# Patient Record
Sex: FEMALE | Race: WHITE
[De-identification: names, ages, dates, MRNs, and addresses within clinical notes are randomized per-mention and may not be internally consistent; named-entity substitution may affect disease eponyms.]

---

## 2020-02-27 ENCOUNTER — HOSPITAL ENCOUNTER (INPATIENT)
Dept: HOSPITAL 26 - MED | Age: 55
LOS: 4 days | Discharge: NURSING, REHAB OR LONG-TERM CARE | DRG: 641 | End: 2020-03-02
Attending: GENERAL PRACTICE | Admitting: GENERAL PRACTICE
Payer: COMMERCIAL

## 2020-02-27 VITALS — DIASTOLIC BLOOD PRESSURE: 46 MMHG | SYSTOLIC BLOOD PRESSURE: 99 MMHG

## 2020-02-27 VITALS — WEIGHT: 198 LBS | BODY MASS INDEX: 33.8 KG/M2 | HEIGHT: 64 IN

## 2020-02-27 VITALS — DIASTOLIC BLOOD PRESSURE: 38 MMHG | SYSTOLIC BLOOD PRESSURE: 93 MMHG

## 2020-02-27 VITALS — SYSTOLIC BLOOD PRESSURE: 96 MMHG | DIASTOLIC BLOOD PRESSURE: 52 MMHG

## 2020-02-27 VITALS — DIASTOLIC BLOOD PRESSURE: 54 MMHG | SYSTOLIC BLOOD PRESSURE: 101 MMHG

## 2020-02-27 DIAGNOSIS — K52.9: ICD-10-CM

## 2020-02-27 DIAGNOSIS — K80.50: ICD-10-CM

## 2020-02-27 DIAGNOSIS — K59.00: ICD-10-CM

## 2020-02-27 DIAGNOSIS — Z90.49: ICD-10-CM

## 2020-02-27 DIAGNOSIS — G80.9: ICD-10-CM

## 2020-02-27 DIAGNOSIS — J10.1: ICD-10-CM

## 2020-02-27 DIAGNOSIS — E86.1: ICD-10-CM

## 2020-02-27 DIAGNOSIS — D63.8: ICD-10-CM

## 2020-02-27 DIAGNOSIS — F20.9: ICD-10-CM

## 2020-02-27 DIAGNOSIS — E03.9: ICD-10-CM

## 2020-02-27 DIAGNOSIS — K83.8: ICD-10-CM

## 2020-02-27 DIAGNOSIS — E87.1: Primary | ICD-10-CM

## 2020-02-27 DIAGNOSIS — E83.42: ICD-10-CM

## 2020-02-27 DIAGNOSIS — F41.9: ICD-10-CM

## 2020-02-27 DIAGNOSIS — F79: ICD-10-CM

## 2020-02-27 DIAGNOSIS — R74.0: ICD-10-CM

## 2020-02-27 DIAGNOSIS — R65.10: ICD-10-CM

## 2020-02-27 LAB
ALBUMIN FLD-MCNC: 3.4 G/DL (ref 3.4–5)
ANION GAP SERPL CALCULATED.3IONS-SCNC: 10.2 MMOL/L (ref 8–16)
ANION GAP SERPL CALCULATED.3IONS-SCNC: 10.3 MMOL/L (ref 8–16)
ANION GAP SERPL CALCULATED.3IONS-SCNC: 11.4 MMOL/L (ref 8–16)
ANION GAP SERPL CALCULATED.3IONS-SCNC: 8.8 MMOL/L (ref 8–16)
APPEARANCE UR: CLEAR
AST SERPL-CCNC: 62 U/L (ref 15–37)
BARBITURATES UR QL SCN: (no result) NG/ML
BASOPHILS # BLD AUTO: 0.1 K/UL (ref 0–0.22)
BASOPHILS NFR BLD AUTO: 2.1 % (ref 0–2)
BENZODIAZ UR QL SCN: (no result) NG/ML
BILIRUB SERPL-MCNC: 0.5 MG/DL (ref 0–1)
BILIRUB UR QL STRIP: NEGATIVE
BUN SERPL-MCNC: 11 MG/DL (ref 7–18)
BUN SERPL-MCNC: 12 MG/DL (ref 7–18)
BUN SERPL-MCNC: 12 MG/DL (ref 7–18)
BUN SERPL-MCNC: 15 MG/DL (ref 7–18)
BZE UR QL SCN: (no result) NG/ML
CANNABINOIDS UR QL SCN: (no result) NG/ML
CHLORIDE SERPL-SCNC: 90 MMOL/L (ref 98–107)
CHLORIDE SERPL-SCNC: 94 MMOL/L (ref 98–107)
CO2 SERPL-SCNC: 22.5 MMOL/L (ref 21–32)
CO2 SERPL-SCNC: 23.3 MMOL/L (ref 21–32)
CO2 SERPL-SCNC: 23.7 MMOL/L (ref 21–32)
CO2 SERPL-SCNC: 24.8 MMOL/L (ref 21–32)
COLOR UR: YELLOW
CREAT SERPL-MCNC: 0.7 MG/DL (ref 0.6–1.3)
CREAT SERPL-MCNC: 0.7 MG/DL (ref 0.6–1.3)
CREAT SERPL-MCNC: 0.8 MG/DL (ref 0.6–1.3)
CREAT SERPL-MCNC: 0.9 MG/DL (ref 0.6–1.3)
EOSINOPHIL # BLD AUTO: 0 K/UL (ref 0–0.4)
EOSINOPHIL NFR BLD AUTO: 0.2 % (ref 0–4)
ERYTHROCYTE [DISTWIDTH] IN BLOOD BY AUTOMATED COUNT: 13.6 % (ref 11.6–13.7)
GFR SERPL CREATININE-BSD FRML MDRD: 111 ML/MIN (ref 90–?)
GFR SERPL CREATININE-BSD FRML MDRD: 111 ML/MIN (ref 90–?)
GFR SERPL CREATININE-BSD FRML MDRD: 83 ML/MIN (ref 90–?)
GFR SERPL CREATININE-BSD FRML MDRD: 95 ML/MIN (ref 90–?)
GLUCOSE SERPL-MCNC: 108 MG/DL (ref 74–106)
GLUCOSE SERPL-MCNC: 77 MG/DL (ref 74–106)
GLUCOSE SERPL-MCNC: 84 MG/DL (ref 74–106)
GLUCOSE SERPL-MCNC: 92 MG/DL (ref 74–106)
GLUCOSE UR STRIP-MCNC: NEGATIVE MG/DL
HCT VFR BLD AUTO: 32.5 % (ref 36–48)
HGB BLD-MCNC: 11.4 G/DL (ref 12–16)
HGB UR QL STRIP: (no result)
IRON SERPL-MCNC: 35 UG/DL (ref 50–175)
LEUKOCYTE ESTERASE UR QL STRIP: NEGATIVE
LIPASE SERPL-CCNC: 94 U/L (ref 73–393)
LYMPHOCYTES # BLD AUTO: 0.3 K/UL (ref 2.5–16.5)
LYMPHOCYTES NFR BLD AUTO: 6.6 % (ref 20.5–51.1)
MAGNESIUM SERPL-MCNC: 1.7 MG/DL (ref 1.8–2.4)
MCH RBC QN AUTO: 34 PG (ref 27–31)
MCHC RBC AUTO-ENTMCNC: 35 G/DL (ref 33–37)
MCV RBC AUTO: 97.1 FL (ref 80–94)
MONOCYTES # BLD AUTO: 0.4 K/UL (ref 0.8–1)
MONOCYTES NFR BLD AUTO: 7.6 % (ref 1.7–9.3)
NEUTROPHILS # BLD AUTO: 4.2 K/UL (ref 1.8–7.7)
NEUTROPHILS NFR BLD AUTO: 83.5 % (ref 42.2–75.2)
NITRITE UR QL STRIP: NEGATIVE
OPIATES UR QL SCN: (no result) NG/ML
PCP UR QL SCN: (no result) NG/ML
PH UR STRIP: 7 [PH] (ref 5–9)
PHOSPHATE SERPL-MCNC: 2.9 MG/DL (ref 2.5–4.9)
PLATELET # BLD AUTO: 202 K/UL (ref 140–450)
POTASSIUM SERPL-SCNC: 3.8 MMOL/L (ref 3.5–5.1)
POTASSIUM SERPL-SCNC: 4 MMOL/L (ref 3.5–5.1)
POTASSIUM SERPL-SCNC: 4.1 MMOL/L (ref 3.5–5.1)
POTASSIUM SERPL-SCNC: 4.1 MMOL/L (ref 3.5–5.1)
PROTHROMBIN TIME: 10.8 SECS (ref 10.8–13.4)
RBC # BLD AUTO: 3.35 MIL/UL (ref 4.2–5.4)
SODIUM SERPL-SCNC: 118 MMOL/L (ref 136–145)
SODIUM SERPL-SCNC: 121 MMOL/L (ref 136–145)
SODIUM SERPL-SCNC: 121 MMOL/L (ref 136–145)
SODIUM SERPL-SCNC: 123 MMOL/L (ref 136–145)
TIBC SERPL-MCNC: 286 UG/DL (ref 250–450)
TSH SERPL DL<=0.05 MIU/L-ACNC: 1.7 UIU/ML (ref 0.34–3.74)
WBC # BLD AUTO: 5 K/UL (ref 4.8–10.8)

## 2020-02-27 PROCEDURE — C1758 CATHETER, URETERAL: HCPCS

## 2020-02-27 RX ADMIN — DOCUSATE SODIUM SCH MG: 100 CAPSULE, LIQUID FILLED ORAL at 22:20

## 2020-02-27 RX ADMIN — LACTULOSE SCH GM: 10 SOLUTION ORAL at 22:20

## 2020-02-27 RX ADMIN — SIMVASTATIN SCH MG: 20 TABLET, FILM COATED ORAL at 22:18

## 2020-02-27 RX ADMIN — OSELTAMIVIR PHOSPHATE SCH MG: 75 CAPSULE ORAL at 22:19

## 2020-02-27 RX ADMIN — DIVALPROEX SODIUM SCH MG: 250 TABLET, DELAYED RELEASE ORAL at 22:19

## 2020-02-27 RX ADMIN — SENNOSIDES A AND B SCH MG: 8.6 TABLET, FILM COATED ORAL at 17:22

## 2020-02-27 RX ADMIN — SODIUM CHLORIDE SCH MLS/HR: 9 INJECTION, SOLUTION INTRAVENOUS at 17:22

## 2020-02-27 NOTE — NUR
PT UNABLE TO VOID ON BEDPAN.

STRAIGHT CATH WITH #14F CATHETER, TOLERATE WELL.

200CC CLEAR YELLOW UOP OBTAINED.

SENT URINE TO LAB FOR UA/C&S

## 2020-02-27 NOTE — NUR
PATIENT TRANSFERED VIA GURNEY WITH CARDIAC MONITOR. PT IN NO ACUTE DISTRESS.

VVS, AWAKE AND ALERT

REPORT GIVEN AT BEDSIDE TO RN FOR ROOM 124A, TELEMETRY

## 2020-02-27 NOTE — NUR
RECEIVED PT FROM ER NURSE, VIA GURNEY, ACCOMPANIED BY CAREGIVER ON THE BEDSIDE, PT IS ALERT, 
AWAKE AND ORIENTED BUT WITH MENTAL DELAY, IV LINE ON THE RT HAND G. 22 WITH NS 3% INFUSING 
AT 45ML/HR, FALL PRECAUTION ENFORCED AND NO SIGN OF DISTRESS NOTED, WILL CONTINUE TO MONITOR 
PT.

## 2020-02-27 NOTE — NUR
NSS 1 L GIVEN AS BOLUS AS ORDERED - WILL CONT. TO MONITOR. THE 3% NSS ALREADY CONSUMED 
INFORMED ANASTASIIA - FOR REPEAT  SERUM NA LEVEL AS ORDERED. WILL CONT. TO MONITOR.

## 2020-02-27 NOTE — NUR
VITAL SIGNS TAKEN TO PT NOW AND BP IS 96/52, PULSE IS 68, TEMP IS 98.6, RESPIRATION IS 
18/MIN AND 02 SATURATION IS 98% ON ROOM AIR., WILL MONITOR PT.

## 2020-02-27 NOTE — NUR
BP RECHECK 90/60 - WILL CONT. TO MONITOR - FULLY SOAKED DIAPER NOTED AT THIS TIME.ON CARDIAC 
MONITOR.

## 2020-02-27 NOTE — NUR
LOW BP - BUT PT APPERS COMFORTABLY RESTING ON BED , CAN FOLLOW COMMAND , O2 SAT WNL , ON 
CARDIAC TRACING - SR - WILL RE CHECK BP . CLOSELY WATCH.

-------------------------------------------------------------------------------

Addendum: 02/28/20 at 0251 by Destiny Payne RN

-------------------------------------------------------------------------------

FULL LIQ. DIET TOLERATED FED BY CNA .

## 2020-02-27 NOTE — NUR
ATTEMPTING TO GET UA SAMPLE, PT WAS INCONTINENT. CLEANED AND PLACED ON BEDPAN 
TO COLLECT URINE SAMPLE.

## 2020-02-27 NOTE — NUR
RECIEVED PT. MENTALLY  DELAYED, BUT CAN FOLLOW SIMPLE COMMANDS , CAN ADDRESS WHAT SHE WANTS 
SUCH AS "I WANT TO EAT". NID - 02 SAT WNL - RA , W/ DIAPER FULLY SOAKED W/ URINE NOTED AT 
THIS TIME. IV SITE INTACT AND PATENT W/ ON GOING 3% NSS X 50 CC /HR - SERUM NA LEVEL - 118. 
ON CARDIAC MONITOR - SR .POC DISCUSSED BUT LIMITED UNDERSTANDING DUE TO  MENTAL STATUS . HAD 
BRAIN SURGERY YRS AGO , HAD FELL HX PRIOR TO ADMISSION .WILL CLOSELY WATCH.

## 2020-02-27 NOTE — NUR
REFER TO ANASTASIIA - BP 99/46 OK 72 , STILL W/ ON GOING 3% NSS X 50 CC /HR - ANASTASIIA SAID RE CHECK THE 
BP AFTER 30 MINS - THEN UPDATE THEM. -WILL CONT TO MONITOR.

## 2020-02-28 VITALS — SYSTOLIC BLOOD PRESSURE: 109 MMHG | DIASTOLIC BLOOD PRESSURE: 73 MMHG

## 2020-02-28 VITALS — DIASTOLIC BLOOD PRESSURE: 56 MMHG | SYSTOLIC BLOOD PRESSURE: 102 MMHG

## 2020-02-28 VITALS — SYSTOLIC BLOOD PRESSURE: 98 MMHG | DIASTOLIC BLOOD PRESSURE: 60 MMHG

## 2020-02-28 VITALS — DIASTOLIC BLOOD PRESSURE: 59 MMHG | SYSTOLIC BLOOD PRESSURE: 100 MMHG

## 2020-02-28 VITALS — DIASTOLIC BLOOD PRESSURE: 64 MMHG | SYSTOLIC BLOOD PRESSURE: 122 MMHG

## 2020-02-28 VITALS — SYSTOLIC BLOOD PRESSURE: 107 MMHG | DIASTOLIC BLOOD PRESSURE: 56 MMHG

## 2020-02-28 LAB
ANION GAP SERPL CALCULATED.3IONS-SCNC: 13.6 MMOL/L (ref 8–16)
ANION GAP SERPL CALCULATED.3IONS-SCNC: 8.8 MMOL/L (ref 8–16)
BASOPHILS # BLD AUTO: 0 K/UL (ref 0–0.22)
BASOPHILS NFR BLD AUTO: 0.4 % (ref 0–2)
BUN SERPL-MCNC: 11 MG/DL (ref 7–18)
BUN SERPL-MCNC: 9 MG/DL (ref 7–18)
CHLORIDE SERPL-SCNC: 95 MMOL/L (ref 98–107)
CHLORIDE SERPL-SCNC: 97 MMOL/L (ref 98–107)
CHOLEST/HDLC SERPL: 2.5 {RATIO} (ref 1–4.5)
CO2 SERPL-SCNC: 21.1 MMOL/L (ref 21–32)
CO2 SERPL-SCNC: 25.4 MMOL/L (ref 21–32)
CREAT SERPL-MCNC: 0.8 MG/DL (ref 0.6–1.3)
CREAT SERPL-MCNC: 0.9 MG/DL (ref 0.6–1.3)
EOSINOPHIL # BLD AUTO: 0 K/UL (ref 0–0.4)
EOSINOPHIL NFR BLD AUTO: 0.1 % (ref 0–4)
ERYTHROCYTE [DISTWIDTH] IN BLOOD BY AUTOMATED COUNT: 13.6 % (ref 11.6–13.7)
FERRITIN SERPL-MCNC: 68 NG/ML (ref 15–150)
FOLATE SERPL-MCNC: 9.3 NG/ML (ref 3–?)
GFR SERPL CREATININE-BSD FRML MDRD: 83 ML/MIN (ref 90–?)
GFR SERPL CREATININE-BSD FRML MDRD: 95 ML/MIN (ref 90–?)
GLUCOSE SERPL-MCNC: 102 MG/DL (ref 74–106)
GLUCOSE SERPL-MCNC: 84 MG/DL (ref 74–106)
HCT VFR BLD AUTO: 29.7 % (ref 36–48)
HDLC SERPL-MCNC: 35 MG/DL (ref 40–60)
HGB BLD-MCNC: 10.7 G/DL (ref 12–16)
LDLC SERPL CALC-MCNC: 40 MG/DL (ref 60–100)
LYMPHOCYTES # BLD AUTO: 1 K/UL (ref 2.5–16.5)
LYMPHOCYTES NFR BLD AUTO: 27.6 % (ref 20.5–51.1)
MAGNESIUM SERPL-MCNC: 2.1 MG/DL (ref 1.8–2.4)
MCH RBC QN AUTO: 35 PG (ref 27–31)
MCHC RBC AUTO-ENTMCNC: 36 G/DL (ref 33–37)
MCV RBC AUTO: 96.1 FL (ref 80–94)
MONOCYTES # BLD AUTO: 0.4 K/UL (ref 0.8–1)
MONOCYTES NFR BLD AUTO: 11.5 % (ref 1.7–9.3)
NEUTROPHILS # BLD AUTO: 2.2 K/UL (ref 1.8–7.7)
NEUTROPHILS NFR BLD AUTO: 60.4 % (ref 42.2–75.2)
PHOSPHATE SERPL-MCNC: 2.5 MG/DL (ref 2.5–4.9)
PLATELET # BLD AUTO: 186 K/UL (ref 140–450)
POTASSIUM SERPL-SCNC: 3.2 MMOL/L (ref 3.5–5.1)
POTASSIUM SERPL-SCNC: 3.7 MMOL/L (ref 3.5–5.1)
RBC # BLD AUTO: 3.09 MIL/UL (ref 4.2–5.4)
SODIUM SERPL-SCNC: 126 MMOL/L (ref 136–145)
SODIUM SERPL-SCNC: 128 MMOL/L (ref 136–145)
TRANSFERRIN SERPL-MCNC: 275 MG/DL (ref 200–370)
TRIGL SERPL-MCNC: 66 MG/DL (ref 30–150)
VIT B12 SERPL-MCNC: 855 PG/ML (ref 232–1245)
WBC # BLD AUTO: 3.7 K/UL (ref 4.8–10.8)

## 2020-02-28 RX ADMIN — SENNOSIDES A AND B SCH MG: 8.6 TABLET, FILM COATED ORAL at 12:58

## 2020-02-28 RX ADMIN — DOCUSATE SODIUM SCH MG: 100 CAPSULE, LIQUID FILLED ORAL at 08:52

## 2020-02-28 RX ADMIN — SIMVASTATIN SCH MG: 20 TABLET, FILM COATED ORAL at 21:11

## 2020-02-28 RX ADMIN — Medication SCH IU: at 08:51

## 2020-02-28 RX ADMIN — LEVOTHYROXINE SODIUM SCH MG: 100 TABLET ORAL at 06:06

## 2020-02-28 RX ADMIN — FENOFIBRATE SCH MG: 48 TABLET ORAL at 08:53

## 2020-02-28 RX ADMIN — SENNOSIDES A AND B SCH MG: 8.6 TABLET, FILM COATED ORAL at 08:54

## 2020-02-28 RX ADMIN — SODIUM CHLORIDE SCH MLS/HR: 9 INJECTION, SOLUTION INTRAVENOUS at 03:29

## 2020-02-28 RX ADMIN — DOCUSATE SODIUM SCH MG: 100 CAPSULE, LIQUID FILLED ORAL at 21:03

## 2020-02-28 RX ADMIN — DIVALPROEX SODIUM SCH MG: 250 TABLET, DELAYED RELEASE ORAL at 08:52

## 2020-02-28 RX ADMIN — OSELTAMIVIR PHOSPHATE SCH MG: 75 CAPSULE ORAL at 08:54

## 2020-02-28 RX ADMIN — SENNOSIDES A AND B SCH MG: 8.6 TABLET, FILM COATED ORAL at 17:40

## 2020-02-28 RX ADMIN — LACTULOSE SCH GM: 10 SOLUTION ORAL at 08:51

## 2020-02-28 RX ADMIN — DIVALPROEX SODIUM SCH MG: 250 TABLET, DELAYED RELEASE ORAL at 21:04

## 2020-02-28 RX ADMIN — OSELTAMIVIR PHOSPHATE SCH MG: 75 CAPSULE ORAL at 21:03

## 2020-02-28 RX ADMIN — LACTULOSE SCH GM: 10 SOLUTION ORAL at 21:03

## 2020-02-28 NOTE — NUR
PATIENT SITTING DOWN IN BED. NO DISTRESS NOTED. SCHEDULED MEDICATIONS DUE GIVEN. WILL 
CONTINUE TO MONITOR. abd pain with n/v

## 2020-02-28 NOTE — NUR
RECEIVED CALL FROM PHARMACY. WE DO NOT HAVE THE SAMSCA 15MG HERE. [facility] WILL BE 
SENDING OVER THE MEDICATION WITH A CARRIER WHO WILL GIVE THE MEDICATION TO THE HOUSE 
SUPERVISOR AND THEY WILL GIVE IT TO THE NIGHTSHIFT NURSE. THE CARRIER IS COMING FROM [facility]. WILL ENDORSE TO NIGHTSHIFT. SAFETY MEASURES IN PLACE

## 2020-02-28 NOTE — NUR
MADE ROUNDS , NO S/ S OF ACUTE DISTRESS NOTED AT THIS TIME - [employer] AND [employer] 
SERVED - FED - WELL TOLERATED.

## 2020-02-28 NOTE — NUR
RECEIVED REPORT FROM AM NURSE. PATIENT LYING DOWN IN BED, AAOX3, CALM, COOPERATIVE, SKIN 
COLOR APPROPRIATE TO ETHNICITY, WARM TO TOUCH. SKIN INTACT. RESPIRATIONS EVEN, UNLABORED, ON 
ROOM AIR. IV SITE INTACT, PATENT, AND INFUSING IVF AS PER MD ORDERS. PER AM NURSE, PATIENT 
HAD A LARGE BM TODAY. REVIEWED PLAN OF CARE WITH PATIENT. PATIENT VERBALIZED UNDERSTANDING. 
SAFETY MEASURES IN PLACE, CALL LIGHT WITHIN REACH. WILL CONTINUE TO MONITOR.

## 2020-02-28 NOTE — NUR
RECEIVED BEDSIDE REPORT FROM NIGHTSHIFT NURSE. PT RESTING IN BED. ABLE TO MAKE NEEDS KNOWN. 
RESPIRATIONS EVEN AND UNLABORED WITH NO SOB OR RESPIRATORY DISTRESS. SKIN WARM AND DRY TO 
TOUCH. SAFETY MEASURES IN PLACE. WILL CONTINUE TO MONITOR.

## 2020-02-28 NOTE — NUR
PATIENT HAS BEEN SCREENED AND CATEGORIZED AS MODERATE NUTRITION RISK. PATIENT WILL BE SEEN 
WITHIN 3-5 DAYS OF ADMISSION.



03/01/20 03/03/20



MELISSA ALVARADO RD

## 2020-02-28 NOTE — NUR
PT SLEEPING IN BED. RESPONSIVE TO VERBAL AND TACTILE STIMULI.ABLE TO MAKE NEEDS KNOWN. 
RESPIRATIONS EVEN AND UNLABORED WITH NO SOB OR RESPIRATORY DISTRESS. SKIN WARM AND DRY TO 
TOUCH.  SAFETY MEASURES IN PLACE. WILL CONTINUE TO MONITOR.

## 2020-02-28 NOTE — NUR
ENDORSED AT BEDSIDE TO NIGHTSHIFT NURSE. PT RESTING IN BED. ABLE TO MAKE NEEDS KNOWN. 
RESPIRATIONS EVEN AND UNLABORED WITH NO SOB OR RESPIRATORY DISTRESS. SKIN WARM AND DRY TO 
TOUCH. SAFETY MEASURES IN PLACE. PT IS STABLE

## 2020-02-28 NOTE — NUR
PT RESTING IN BED. ABLE TO MAKE NEEDS KNOWN. RESPIRATIONS EVEN AND UNLABORED WITH NO SOB OR 
RESPIRATORY DISTRESS. SKIN WARM AND DRY TO TOUCH.  SAFETY MEASURES IN PLACE. WILL CONTINUE 
TO MONITOR.

## 2020-02-28 NOTE — NUR
MADE ROUNDS , BP97/47 - PT . CAN FOLLOW COMMANDS, O2 SAT WNL , UT 69 , W/ ANOTHER FULLY 
SOAKED W/URINE DIAPER NOTED , REFER TO ANASTASIIA , MADE NEW ORDER AND CARIED OUT . WILL CONT. TO 
MONITOR. - UPDATE CHARGE ABOUT THE CONDITION OF THE PT.

## 2020-02-28 NOTE — NUR
ADMINISTERED SCHED MED AS PRESCRIBED PER MD ORDER. PT TOLERATED WELL. MEDICATION EDUCATION 
PERFORMED. PT VERBALIZED UNDERSTANDING. SAFETY MEASURES IN PLACE. WILL CONTINUE TO MONITOR.

## 2020-02-28 NOTE — NUR
MADE ROUNDS THE IV BOLUS CONSUMED , THE PREVOUSLY  NSS ON THE BEDSIDE  RE HOOKED AND 
REGULATE TO 70 CC/HR AS PREVIOUSLY ORDERED - INFORM ANASTASIIA .WILL CONT. TO MONITOR.

## 2020-02-29 VITALS — SYSTOLIC BLOOD PRESSURE: 104 MMHG | DIASTOLIC BLOOD PRESSURE: 63 MMHG

## 2020-02-29 VITALS — SYSTOLIC BLOOD PRESSURE: 100 MMHG | DIASTOLIC BLOOD PRESSURE: 58 MMHG

## 2020-02-29 VITALS — SYSTOLIC BLOOD PRESSURE: 105 MMHG | DIASTOLIC BLOOD PRESSURE: 61 MMHG

## 2020-02-29 VITALS — SYSTOLIC BLOOD PRESSURE: 116 MMHG | DIASTOLIC BLOOD PRESSURE: 61 MMHG

## 2020-02-29 VITALS — DIASTOLIC BLOOD PRESSURE: 62 MMHG | SYSTOLIC BLOOD PRESSURE: 101 MMHG

## 2020-02-29 VITALS — SYSTOLIC BLOOD PRESSURE: 115 MMHG | DIASTOLIC BLOOD PRESSURE: 66 MMHG

## 2020-02-29 LAB
ANION GAP SERPL CALCULATED.3IONS-SCNC: 10.9 MMOL/L (ref 8–16)
ANION GAP SERPL CALCULATED.3IONS-SCNC: 11.4 MMOL/L (ref 8–16)
BASOPHILS # BLD AUTO: 0 K/UL (ref 0–0.22)
BASOPHILS NFR BLD AUTO: 0.3 % (ref 0–2)
BUN SERPL-MCNC: 5 MG/DL (ref 7–18)
BUN SERPL-MCNC: 7 MG/DL (ref 7–18)
CHLORIDE SERPL-SCNC: 102 MMOL/L (ref 98–107)
CHLORIDE SERPL-SCNC: 99 MMOL/L (ref 98–107)
CO2 SERPL-SCNC: 24.9 MMOL/L (ref 21–32)
CO2 SERPL-SCNC: 27.8 MMOL/L (ref 21–32)
CREAT SERPL-MCNC: 0.9 MG/DL (ref 0.6–1.3)
CREAT SERPL-MCNC: 0.9 MG/DL (ref 0.6–1.3)
EOSINOPHIL # BLD AUTO: 0 K/UL (ref 0–0.4)
EOSINOPHIL NFR BLD AUTO: 0.2 % (ref 0–4)
ERYTHROCYTE [DISTWIDTH] IN BLOOD BY AUTOMATED COUNT: 13.6 % (ref 11.6–13.7)
GFR SERPL CREATININE-BSD FRML MDRD: 83 ML/MIN (ref 90–?)
GFR SERPL CREATININE-BSD FRML MDRD: 83 ML/MIN (ref 90–?)
GLUCOSE SERPL-MCNC: 174 MG/DL (ref 74–106)
GLUCOSE SERPL-MCNC: 97 MG/DL (ref 74–106)
HCT VFR BLD AUTO: 32.7 % (ref 36–48)
HGB BLD-MCNC: 11.7 G/DL (ref 12–16)
LYMPHOCYTES # BLD AUTO: 1.4 K/UL (ref 2.5–16.5)
LYMPHOCYTES NFR BLD AUTO: 32.9 % (ref 20.5–51.1)
MAGNESIUM SERPL-MCNC: 1.9 MG/DL (ref 1.8–2.4)
MCH RBC QN AUTO: 34 PG (ref 27–31)
MCHC RBC AUTO-ENTMCNC: 36 G/DL (ref 33–37)
MCV RBC AUTO: 96 FL (ref 80–94)
MONOCYTES # BLD AUTO: 0.4 K/UL (ref 0.8–1)
MONOCYTES NFR BLD AUTO: 8.7 % (ref 1.7–9.3)
NEUTROPHILS # BLD AUTO: 2.5 K/UL (ref 1.8–7.7)
NEUTROPHILS NFR BLD AUTO: 57.9 % (ref 42.2–75.2)
PHOSPHATE SERPL-MCNC: 3 MG/DL (ref 2.5–4.9)
PLATELET # BLD AUTO: 189 K/UL (ref 140–450)
POTASSIUM SERPL-SCNC: 3.7 MMOL/L (ref 3.5–5.1)
POTASSIUM SERPL-SCNC: 4.3 MMOL/L (ref 3.5–5.1)
RBC # BLD AUTO: 3.41 MIL/UL (ref 4.2–5.4)
SODIUM SERPL-SCNC: 131 MMOL/L (ref 136–145)
SODIUM SERPL-SCNC: 137 MMOL/L (ref 136–145)
WBC # BLD AUTO: 4.4 K/UL (ref 4.8–10.8)

## 2020-02-29 RX ADMIN — ACETAMINOPHEN PRN MG: 325 TABLET ORAL at 01:48

## 2020-02-29 RX ADMIN — SENNOSIDES A AND B SCH MG: 8.6 TABLET, FILM COATED ORAL at 17:00

## 2020-02-29 RX ADMIN — OSELTAMIVIR PHOSPHATE SCH MG: 75 CAPSULE ORAL at 20:32

## 2020-02-29 RX ADMIN — LEVOTHYROXINE SODIUM SCH MG: 100 TABLET ORAL at 06:04

## 2020-02-29 RX ADMIN — OSELTAMIVIR PHOSPHATE SCH MG: 75 CAPSULE ORAL at 09:45

## 2020-02-29 RX ADMIN — LACTULOSE SCH GM: 10 SOLUTION ORAL at 09:00

## 2020-02-29 RX ADMIN — SIMVASTATIN SCH MG: 20 TABLET, FILM COATED ORAL at 20:31

## 2020-02-29 RX ADMIN — DIVALPROEX SODIUM SCH MG: 250 TABLET, DELAYED RELEASE ORAL at 20:31

## 2020-02-29 RX ADMIN — Medication SCH IU: at 09:44

## 2020-02-29 RX ADMIN — DIVALPROEX SODIUM SCH MG: 250 TABLET, DELAYED RELEASE ORAL at 09:42

## 2020-02-29 RX ADMIN — ACETAMINOPHEN PRN MG: 325 TABLET ORAL at 14:53

## 2020-02-29 RX ADMIN — SENNOSIDES A AND B SCH MG: 8.6 TABLET, FILM COATED ORAL at 09:00

## 2020-02-29 RX ADMIN — LACTULOSE SCH GM: 10 SOLUTION ORAL at 20:30

## 2020-02-29 RX ADMIN — FENOFIBRATE SCH MG: 48 TABLET ORAL at 09:47

## 2020-02-29 RX ADMIN — DOCUSATE SODIUM SCH MG: 100 CAPSULE, LIQUID FILLED ORAL at 20:31

## 2020-02-29 RX ADMIN — SENNOSIDES A AND B SCH MG: 8.6 TABLET, FILM COATED ORAL at 13:00

## 2020-02-29 RX ADMIN — DOCUSATE SODIUM SCH MG: 100 CAPSULE, LIQUID FILLED ORAL at 09:00

## 2020-02-29 NOTE — NUR
RECEIVED BEDSIDE REPORT FROM DAY SHIFT NURSE. PATIENT IS AWAKE AND COOPERATIVE. RESPIRATION 
EVEN UNLABORED ON ROOM AIR. NO DISTRESS NOTED. SKIN IS WARM AND DRY. IV PATENT AND INTACT. 
PLAN OF CARE WAS DISCUSSED. ALL SAFETY MEASURES IN PLACE. BED IS AT LOW POSITION. CALL LIGHT 
WITHIN REACH. WILL CONTINUE TO MONITOR.

## 2020-02-29 NOTE — NUR
PT WAS GIVEN THE SCHEDULED AM MEDICATIONS, PLATELET , ASPIRATION PRECAUTION INITIATED, 
TOLERATED MEDICATIONS, WILL MONITOR PT.

## 2020-02-29 NOTE — NUR
PATIENT LYING DOWN IN BED SLEEPING, AROUSBLE BY VOICE. SCHEDULED MEDICATIONS DUE GIVEN. WILL 
CONTINUE TO MONITOR.

## 2020-02-29 NOTE — NUR
SCHEDULED STOOL SOFTENERS IN THE AM WAS HELD DUE TO PT'S BOWEL TO BE LIQUID, DR. ALEXANDER WAS 
INFORMED.

## 2020-02-29 NOTE — NUR
DR. ALEXANDER CAME TO PT'S ROOM AND IS TALKING AND ASSESSING THE PT NOW, PT RESPONDING 
APPROPRIATELY.

## 2020-02-29 NOTE — NUR
RECEIVED PT FROM NIGHT SHIFT NURSEHARRY, PT IS AWAKE AND LYING ON THE BED WITH SIDE RAILS 
UP AND CALL LIGHT WITHIN REACH, IV LINE ON THE RT WRIST G. 24 WITH IVF NS INFUSING AT 
5ML/HR, SAFETY AND FALL PRECAUTION ENFORCED, BED ALARM ACTIVATED, PT ON ROOM AIR, SCD IN 
PLACE, PT HAS MENTAL DELAY BUT ALERT,DENIES PAIN, ON CONTACT AND DROPLET ISOLATIONS, AND NO 
SIGN OF DISTRESS NOTED. WILL MONITOR PT

## 2020-02-29 NOTE — NUR
INITIAL ASSESSMENT DONE. VITALS WERE TAKEN. PATIENT IS IN STABLE CONDITION. WILL CONTINUE TO 
MONITOR.

## 2020-02-29 NOTE — NUR
PATIENT IS SLEEPING RESPIRATION EVEN UNLABORED ON ROOM AIR. NO DISTRESS NOTED. WILL CONTINUE 
TO MONITOR.

## 2020-03-01 VITALS — SYSTOLIC BLOOD PRESSURE: 104 MMHG | DIASTOLIC BLOOD PRESSURE: 63 MMHG

## 2020-03-01 VITALS — DIASTOLIC BLOOD PRESSURE: 74 MMHG | SYSTOLIC BLOOD PRESSURE: 110 MMHG

## 2020-03-01 VITALS — SYSTOLIC BLOOD PRESSURE: 106 MMHG | DIASTOLIC BLOOD PRESSURE: 69 MMHG

## 2020-03-01 VITALS — SYSTOLIC BLOOD PRESSURE: 107 MMHG | DIASTOLIC BLOOD PRESSURE: 68 MMHG

## 2020-03-01 VITALS — SYSTOLIC BLOOD PRESSURE: 102 MMHG | DIASTOLIC BLOOD PRESSURE: 51 MMHG

## 2020-03-01 VITALS — SYSTOLIC BLOOD PRESSURE: 101 MMHG | DIASTOLIC BLOOD PRESSURE: 56 MMHG

## 2020-03-01 LAB
ANION GAP SERPL CALCULATED.3IONS-SCNC: 10.6 MMOL/L (ref 8–16)
BASOPHILS # BLD AUTO: 0 K/UL (ref 0–0.22)
BASOPHILS NFR BLD AUTO: 0.5 % (ref 0–2)
BUN SERPL-MCNC: 3 MG/DL (ref 7–18)
CHLORIDE SERPL-SCNC: 102 MMOL/L (ref 98–107)
CO2 SERPL-SCNC: 29.3 MMOL/L (ref 21–32)
CREAT SERPL-MCNC: 0.8 MG/DL (ref 0.6–1.3)
EOSINOPHIL # BLD AUTO: 0.1 K/UL (ref 0–0.4)
EOSINOPHIL NFR BLD AUTO: 1.1 % (ref 0–4)
ERYTHROCYTE [DISTWIDTH] IN BLOOD BY AUTOMATED COUNT: 13.4 % (ref 11.6–13.7)
GFR SERPL CREATININE-BSD FRML MDRD: 95 ML/MIN (ref 90–?)
GLUCOSE SERPL-MCNC: 87 MG/DL (ref 74–106)
HCT VFR BLD AUTO: 33.5 % (ref 36–48)
HGB BLD-MCNC: 11.9 G/DL (ref 12–16)
LYMPHOCYTES # BLD AUTO: 1.5 K/UL (ref 2.5–16.5)
LYMPHOCYTES NFR BLD AUTO: 26.7 % (ref 20.5–51.1)
MAGNESIUM SERPL-MCNC: 1.8 MG/DL (ref 1.8–2.4)
MCH RBC QN AUTO: 34 PG (ref 27–31)
MCHC RBC AUTO-ENTMCNC: 36 G/DL (ref 33–37)
MCV RBC AUTO: 96.5 FL (ref 80–94)
MONOCYTES # BLD AUTO: 0.5 K/UL (ref 0.8–1)
MONOCYTES NFR BLD AUTO: 8 % (ref 1.7–9.3)
NEUTROPHILS # BLD AUTO: 3.7 K/UL (ref 1.8–7.7)
NEUTROPHILS NFR BLD AUTO: 63.7 % (ref 42.2–75.2)
PHOSPHATE SERPL-MCNC: 2.7 MG/DL (ref 2.5–4.9)
PLATELET # BLD AUTO: 211 K/UL (ref 140–450)
POTASSIUM SERPL-SCNC: 3.9 MMOL/L (ref 3.5–5.1)
RBC # BLD AUTO: 3.47 MIL/UL (ref 4.2–5.4)
SODIUM SERPL-SCNC: 138 MMOL/L (ref 136–145)
WBC # BLD AUTO: 5.8 K/UL (ref 4.8–10.8)

## 2020-03-01 RX ADMIN — SIMVASTATIN SCH MG: 20 TABLET, FILM COATED ORAL at 20:30

## 2020-03-01 RX ADMIN — OSELTAMIVIR PHOSPHATE SCH MG: 75 CAPSULE ORAL at 20:27

## 2020-03-01 RX ADMIN — OSELTAMIVIR PHOSPHATE SCH MG: 75 CAPSULE ORAL at 09:21

## 2020-03-01 RX ADMIN — DIVALPROEX SODIUM SCH MG: 250 TABLET, DELAYED RELEASE ORAL at 09:22

## 2020-03-01 RX ADMIN — SENNOSIDES A AND B SCH MG: 8.6 TABLET, FILM COATED ORAL at 14:22

## 2020-03-01 RX ADMIN — SENNOSIDES A AND B SCH MG: 8.6 TABLET, FILM COATED ORAL at 17:59

## 2020-03-01 RX ADMIN — FENOFIBRATE SCH MG: 48 TABLET ORAL at 09:21

## 2020-03-01 RX ADMIN — LEVOTHYROXINE SODIUM SCH MG: 100 TABLET ORAL at 06:21

## 2020-03-01 RX ADMIN — DIVALPROEX SODIUM SCH MG: 250 TABLET, DELAYED RELEASE ORAL at 20:29

## 2020-03-01 RX ADMIN — DOCUSATE SODIUM SCH MG: 100 CAPSULE, LIQUID FILLED ORAL at 09:23

## 2020-03-01 RX ADMIN — DOCUSATE SODIUM SCH MG: 100 CAPSULE, LIQUID FILLED ORAL at 20:28

## 2020-03-01 RX ADMIN — FUROSEMIDE SCH MG: 20 TABLET ORAL at 09:00

## 2020-03-01 RX ADMIN — LACTULOSE SCH GM: 10 SOLUTION ORAL at 20:30

## 2020-03-01 RX ADMIN — SENNOSIDES A AND B SCH MG: 8.6 TABLET, FILM COATED ORAL at 09:24

## 2020-03-01 RX ADMIN — Medication SCH IU: at 09:23

## 2020-03-01 RX ADMIN — LACTULOSE SCH GM: 10 SOLUTION ORAL at 09:26

## 2020-03-01 NOTE — NUR
CHECKED PATIENT. PATIENT WATCHING TV RESPIRATION EVEN UNLABORED ON ROOM AIR. NO DISTRESS 
NOTED. WILL CONTINUE TO MONITOR

## 2020-03-01 NOTE — NUR
PT WAS GIVEN THE SCHEDULED AM MEDICATIONS, PARAMETER CHECKED, BP /59, O2 SATURATION IS 
97%, PULSE IS 83, PLATELET , PT TOLERATED AND WILL CONTINUE TO MONITOR PT.

## 2020-03-01 NOTE — NUR
RECEIVED PT FROM NIGHT SHIFT NURSE, NANCY, PT IS AWAKE AND LYING ON THE BED WITH SIDE RAILS 
UP AND CALL LIGHT WITHIN REACH, IV LINE NOTED ON THE RT WRIST G. 24 WITH IVF ON TKO, SCD IN 
PLACE, ON ROOM AIR, NO SIGN OF DISTRESS AND WILL CONTINUE TO MONITOR PT.

## 2020-03-01 NOTE — NUR
INITIAL ASSESSMENT DONE. VITALS WERE TAKEN. PATIENT IS IN STABLE CONDITION. NO DISTRESS 
NOTED. WILL CONTINUE TO MONITOR.

## 2020-03-02 VITALS — SYSTOLIC BLOOD PRESSURE: 132 MMHG | DIASTOLIC BLOOD PRESSURE: 76 MMHG

## 2020-03-02 VITALS — DIASTOLIC BLOOD PRESSURE: 69 MMHG | SYSTOLIC BLOOD PRESSURE: 117 MMHG

## 2020-03-02 VITALS — DIASTOLIC BLOOD PRESSURE: 75 MMHG | SYSTOLIC BLOOD PRESSURE: 115 MMHG

## 2020-03-02 VITALS — SYSTOLIC BLOOD PRESSURE: 115 MMHG | DIASTOLIC BLOOD PRESSURE: 75 MMHG

## 2020-03-02 VITALS — DIASTOLIC BLOOD PRESSURE: 63 MMHG | SYSTOLIC BLOOD PRESSURE: 102 MMHG

## 2020-03-02 LAB
ANION GAP SERPL CALCULATED.3IONS-SCNC: 10.2 MMOL/L (ref 8–16)
BASOPHILS # BLD AUTO: 0 K/UL (ref 0–0.22)
BASOPHILS NFR BLD AUTO: 0.9 % (ref 0–2)
BUN SERPL-MCNC: 5 MG/DL (ref 7–18)
CHLORIDE SERPL-SCNC: 101 MMOL/L (ref 98–107)
CO2 SERPL-SCNC: 28.6 MMOL/L (ref 21–32)
CREAT SERPL-MCNC: 0.9 MG/DL (ref 0.6–1.3)
EOSINOPHIL # BLD AUTO: 0.1 K/UL (ref 0–0.4)
EOSINOPHIL NFR BLD AUTO: 2.4 % (ref 0–4)
ERYTHROCYTE [DISTWIDTH] IN BLOOD BY AUTOMATED COUNT: 13.6 % (ref 11.6–13.7)
GFR SERPL CREATININE-BSD FRML MDRD: 83 ML/MIN (ref 90–?)
GLUCOSE SERPL-MCNC: 115 MG/DL (ref 74–106)
HCT VFR BLD AUTO: 34.1 % (ref 36–48)
HGB BLD-MCNC: 12 G/DL (ref 12–16)
LYMPHOCYTES # BLD AUTO: 1.6 K/UL (ref 2.5–16.5)
LYMPHOCYTES NFR BLD AUTO: 31 % (ref 20.5–51.1)
MAGNESIUM SERPL-MCNC: 1.8 MG/DL (ref 1.8–2.4)
MCH RBC QN AUTO: 35 PG (ref 27–31)
MCHC RBC AUTO-ENTMCNC: 35 G/DL (ref 33–37)
MCV RBC AUTO: 98.4 FL (ref 80–94)
MONOCYTES # BLD AUTO: 0.6 K/UL (ref 0.8–1)
MONOCYTES NFR BLD AUTO: 11.8 % (ref 1.7–9.3)
NEUTROPHILS # BLD AUTO: 2.8 K/UL (ref 1.8–7.7)
NEUTROPHILS NFR BLD AUTO: 53.9 % (ref 42.2–75.2)
PHOSPHATE SERPL-MCNC: 4.6 MG/DL (ref 2.5–4.9)
PLATELET # BLD AUTO: 187 K/UL (ref 140–450)
POTASSIUM SERPL-SCNC: 3.8 MMOL/L (ref 3.5–5.1)
RBC # BLD AUTO: 3.46 MIL/UL (ref 4.2–5.4)
SODIUM SERPL-SCNC: 136 MMOL/L (ref 136–145)
WBC # BLD AUTO: 5.2 K/UL (ref 4.8–10.8)

## 2020-03-02 RX ADMIN — SENNOSIDES A AND B SCH MG: 8.6 TABLET, FILM COATED ORAL at 13:00

## 2020-03-02 RX ADMIN — DIVALPROEX SODIUM SCH MG: 250 TABLET, DELAYED RELEASE ORAL at 08:36

## 2020-03-02 RX ADMIN — FENOFIBRATE SCH MG: 48 TABLET ORAL at 08:37

## 2020-03-02 RX ADMIN — LEVOTHYROXINE SODIUM SCH MG: 100 TABLET ORAL at 05:39

## 2020-03-02 RX ADMIN — FUROSEMIDE SCH MG: 20 TABLET ORAL at 08:37

## 2020-03-02 RX ADMIN — Medication SCH IU: at 08:39

## 2020-03-02 RX ADMIN — OSELTAMIVIR PHOSPHATE SCH MG: 75 CAPSULE ORAL at 08:36

## 2020-03-02 RX ADMIN — DOCUSATE SODIUM SCH MG: 100 CAPSULE, LIQUID FILLED ORAL at 08:34

## 2020-03-02 RX ADMIN — SENNOSIDES A AND B SCH MG: 8.6 TABLET, FILM COATED ORAL at 08:38

## 2020-03-02 RX ADMIN — LACTULOSE SCH GM: 10 SOLUTION ORAL at 08:39

## 2020-03-02 NOTE — NUR
GIVEN REPORT TO DAVIS BERNAL FROM [facility]. DISCUSSED PATIENT'S LABS, VITAL SIGNS, CARE 
PLAN, REASON FOR TRANSFER. NO FURTHER QUESTIONS.

## 2020-03-02 NOTE — NUR
PATIENT IS SLEEPING AT THIS TIME. NO SIGNS OF DISTRESS NOTED. BED IN LOW POSITION. CALL 
LIGHT IS WITHIN REACH. WILL CONTINUE TO MONITOR

## 2020-03-02 NOTE — NUR
DISCHARGED PATIENT VIA GURNEY ACCOMPANIED BY TRANSPORT PERSONNEL. DENIES PAIN. NO SOB. 
PATIENT IS IN STABLE CONDITION

## 2020-03-02 NOTE — NUR
DC PLANNING:

PT HAS A DC ORDER TO GO TO SNF FOR PHYSICAL THERAPY , PER DR ALEXANDER THE [facility] BO[facility]ING 
HOME WON'T ALLOW WALKER PLUS NO HOME HEALTH PERMITTED. PT IS ACCEPTED AT [facility] FOR 
FURTHER PHYSICAL THERAPY. PER LLOYD AT [facility] PT CAN GO TO ROOM 100A # TO GIVE REPORT 
[phone]. CALLED [facility] FOR AUTH FOR TRANSPORT SPOKE WITH JORI , PROVIDE AUTH # 
[id number]  ARRANGED TRANSPORT WITH [facility]  SPOKE WITH GABRIELLE,  
TIME BETWEEN 1-2 PM NOTIFIED JOYCE CHARGE NURSE.

## 2020-03-02 NOTE — NUR
GIVEN DISCHARGED INSTRUCTIONS. EXPLAINED THAT DR. HDEZ WILL BE THE PATIENT'S DOCTOR AT 
[facility]. TO CONTINUE MEDICATIONS. PATIENT REFUSED FLU AND PNA VACCINES. PATIENT SIGNED 
DISCHARGE PAPERWORK. NO FURTHER QUESTIONS

## 2020-03-02 NOTE — NUR
HELD SENNA BECAUSE PATIENT IS HAVING DIARRHEA. BED IN LOW POSITION. CALL LIGHT IS WITHIN 
REACH. WILL CONTINUE TO MONITOR

## 2020-03-02 NOTE — NUR
CHECKED PATIENT. PATIENT SLEEPING RESPIRATION EVEN UNLABORED ON ROOM AIR. NO DISTRESS NOTED. 
WILL CONTINUE TO MONITOR,

## 2020-03-02 NOTE — NUR
RECEIVED PATIENT FROM NIGHT SHIFT NURSE FOR CONTINUITY OF CARE. PATIENT IS SLEEPING AT THIS 
TIME. RESPIRATIONS EVEN AND UNLABORED, ROOM AIR. VISIBLE CHEST RISE. ON TELE MONITORING. 
ABDOMEN SOFT, ROUND, AND NONTENDER. SKIN WARM, DRY, AND INTACT. IV IN THE RIGHT WRIST 24G, 
SALINE LOCK. IV PATENT AND FLUSHED WELL.  PATIENT IS BEDBOUND. BED IN LOW POSITION. FALL, 
DROPLET, AND CONTACT PRECAUTIONS. WILL CONTINUE TO MONITOR.

## 2020-03-02 NOTE — NUR
PATIENT IS EATING LUNCH AT THIS MOMENT. NO SIGNS OF DISTRESS NOTED.BED IN LOW POSITION. CALL 
LIGHT IS WITHIN REACH. WILL CONTINUE TO MONITOR

## 2020-03-02 NOTE — NUR
GIVEN MORNING MEDICATIONS PO AS SCHEDULED. HEPARIN IN THE LEFT UPPER ARM. PLATELET . 
EXPLAINED TO PATIENT MED AND SIDE EFFECTS. PATIENT VERBALIZED UNDERSTANDING. BED IN LOW 
POSITION. CALL LIGHT IS WITHIN REACH. WILL CONTINUE TO MONITOR

## 2021-09-15 ENCOUNTER — HOSPITAL ENCOUNTER (EMERGENCY)
Dept: HOSPITAL 26 - MED | Age: 56
Discharge: HOME | End: 2021-09-15
Payer: COMMERCIAL

## 2021-09-15 VITALS — SYSTOLIC BLOOD PRESSURE: 136 MMHG | DIASTOLIC BLOOD PRESSURE: 78 MMHG

## 2021-09-15 VITALS — BODY MASS INDEX: 23.99 KG/M2 | WEIGHT: 140.5 LBS | HEIGHT: 64 IN

## 2021-09-15 VITALS — DIASTOLIC BLOOD PRESSURE: 78 MMHG | SYSTOLIC BLOOD PRESSURE: 136 MMHG
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
## 2021-09-15 DIAGNOSIS — F03.90: ICD-10-CM

## 2021-09-15 DIAGNOSIS — S93.401A: Primary | ICD-10-CM

## 2021-09-15 DIAGNOSIS — Z79.899: ICD-10-CM
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
## 2021-09-15 DIAGNOSIS — E11.9: ICD-10-CM
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
## 2021-09-15 PROCEDURE — 99283 EMERGENCY DEPT VISIT LOW MDM: CPT

## 2021-09-15 PROCEDURE — 73610 X-RAY EXAM OF ANKLE: CPT

## 2021-09-15 NOTE — NUR
Patient discharged with v/s stable. Written and verbal after care instructions 
given ANKLE SPRAIN and explained. 

Patient alert, oriented and verbalized understanding of instructions. Wheel 
Chair Assisted with to car. All questions addressed prior to discharge. ID band 
removed. Patient advised to follow up with PMD. Rx of MOTRIN given. Patient 
educated on indication of medication including possible reaction and side 
effects. Opportunity to ask questions provided and answered.

## 2021-09-15 NOTE — NUR
-------------------------------------------------------------------------------

            *** Note undone in ED - 09/15/21 at 1939 by MED1 ***            

-------------------------------------------------------------------------------

DR. CHANDLER AT  BEDSIDE FOR FURTHER EVALUATION.

## 2021-09-15 NOTE — NUR
57YO F BIB CAREGIVER FROM [facility] C/O RIGHT ANKLE PAIN AND SWELLING 
SINCE THIS AFTERNOON. CAREGIVER FOUND PT ON AL FOURS IN THE RESTROOM. GIVEN 
400MG IBUPROFEN 2HRS AGO. 





PMH: MILD INTELLECTUAL DISABILITY, IMPULSE CONTROL D/O, SEIZURE, HYPOTHYROID

MEDS: SEE LIST

NKA

## 2021-10-13 ENCOUNTER — HOSPITAL ENCOUNTER (INPATIENT)
Dept: HOSPITAL 26 - MED | Age: 56
LOS: 2 days | Discharge: NURSING, REHAB OR LONG-TERM CARE | DRG: 442 | End: 2021-10-15
Attending: STUDENT IN AN ORGANIZED HEALTH CARE EDUCATION/TRAINING PROGRAM | Admitting: STUDENT IN AN ORGANIZED HEALTH CARE EDUCATION/TRAINING PROGRAM
Payer: COMMERCIAL

## 2021-10-13 VITALS — DIASTOLIC BLOOD PRESSURE: 71 MMHG | SYSTOLIC BLOOD PRESSURE: 122 MMHG

## 2021-10-13 VITALS — DIASTOLIC BLOOD PRESSURE: 65 MMHG | SYSTOLIC BLOOD PRESSURE: 137 MMHG

## 2021-10-13 VITALS — BODY MASS INDEX: 23.99 KG/M2 | HEIGHT: 65 IN | WEIGHT: 144 LBS

## 2021-10-13 DIAGNOSIS — E87.8: ICD-10-CM

## 2021-10-13 DIAGNOSIS — K72.90: Primary | ICD-10-CM

## 2021-10-13 DIAGNOSIS — K59.00: ICD-10-CM

## 2021-10-13 DIAGNOSIS — Z20.822: ICD-10-CM

## 2021-10-13 DIAGNOSIS — D63.8: ICD-10-CM

## 2021-10-13 DIAGNOSIS — G40.909: ICD-10-CM

## 2021-10-13 DIAGNOSIS — E22.2: ICD-10-CM

## 2021-10-13 LAB
ALBUMIN FLD-MCNC: 3.7 G/DL (ref 3.4–5)
AMYLASE SERPL-CCNC: 39 U/L (ref 25–115)
ANION GAP SERPL CALCULATED.3IONS-SCNC: 11.8 MMOL/L (ref 8–16)
APAP SERPL-MCNC: < 0.5 UG/ML (ref 10–30)
APPEARANCE UR: CLEAR
AST SERPL-CCNC: 25 U/L (ref 15–37)
BARBITURATES UR QL SCN: NEGATIVE NG/ML
BASOPHILS # BLD AUTO: 0 K/UL (ref 0–0.22)
BASOPHILS NFR BLD AUTO: 0.4 % (ref 0–2)
BENZODIAZ UR QL SCN: NEGATIVE NG/ML
BILIRUB SERPL-MCNC: 0.5 MG/DL (ref 0–1)
BILIRUB UR QL STRIP: NEGATIVE
BUN SERPL-MCNC: 10 MG/DL (ref 7–18)
BZE UR QL SCN: NEGATIVE NG/ML
CANNABINOIDS UR QL SCN: NEGATIVE NG/ML
CHLORIDE SERPL-SCNC: 91 MMOL/L (ref 98–107)
CHOLEST/HDLC SERPL: 1.9 {RATIO} (ref 1–4.5)
CO2 SERPL-SCNC: 26 MMOL/L (ref 21–32)
COLOR UR: YELLOW
CREAT SERPL-MCNC: 0.6 MG/DL (ref 0.6–1.3)
EOSINOPHIL # BLD AUTO: 0.1 K/UL (ref 0–0.4)
EOSINOPHIL NFR BLD AUTO: 2.4 % (ref 0–4)
ERYTHROCYTE [DISTWIDTH] IN BLOOD BY AUTOMATED COUNT: 13.4 % (ref 11.6–13.7)
GFR SERPL CREATININE-BSD FRML MDRD: 132 ML/MIN (ref 90–?)
GLUCOSE SERPL-MCNC: 93 MG/DL (ref 74–106)
GLUCOSE UR STRIP-MCNC: NEGATIVE MG/DL
HCT VFR BLD AUTO: 29.4 % (ref 36–48)
HDLC SERPL-MCNC: 53 MG/DL (ref 40–60)
HGB BLD-MCNC: 10.3 G/DL (ref 12–16)
HGB UR QL STRIP: (no result)
LDLC SERPL CALC-MCNC: 39 MG/DL (ref 60–100)
LEUKOCYTE ESTERASE UR QL STRIP: NEGATIVE
LIPASE SERPL-CCNC: 189 U/L (ref 73–393)
LYMPHOCYTES # BLD AUTO: 2 K/UL (ref 2.5–16.5)
LYMPHOCYTES NFR BLD AUTO: 44.2 % (ref 20.5–51.1)
MCH RBC QN AUTO: 35 PG (ref 27–31)
MCHC RBC AUTO-ENTMCNC: 35 G/DL (ref 33–37)
MCV RBC AUTO: 99.5 FL (ref 80–94)
MONOCYTES # BLD AUTO: 0.4 K/UL (ref 0.8–1)
MONOCYTES NFR BLD AUTO: 8.3 % (ref 1.7–9.3)
NEUTROPHILS # BLD AUTO: 2 K/UL (ref 1.8–7.7)
NEUTROPHILS NFR BLD AUTO: 44.7 % (ref 42.2–75.2)
NITRITE UR QL STRIP: NEGATIVE
OPIATES UR QL SCN: NEGATIVE NG/ML
PCP UR QL SCN: NEGATIVE NG/ML
PH UR STRIP: 7 [PH] (ref 5–9)
PHOSPHATE SERPL-MCNC: 4 MG/DL (ref 2.5–4.9)
PLATELET # BLD AUTO: 283 K/UL (ref 140–450)
POTASSIUM SERPL-SCNC: 3.8 MMOL/L (ref 3.5–5.1)
PROTHROMBIN TIME: 10.9 SECS (ref 10.8–13.4)
RBC # BLD AUTO: 2.96 MIL/UL (ref 4.2–5.4)
SALICYLATES SERPL-MCNC: < 2.8 MG/DL (ref 2.8–20)
SODIUM SERPL-SCNC: 125 MMOL/L (ref 136–145)
T4 FREE SERPL-MCNC: 0.92 NG/DL (ref 0.76–1.46)
T4 FREE SERPL-MCNC: 0.93 NG/DL (ref 0.76–1.46)
TRIGL SERPL-MCNC: 48 MG/DL (ref 30–150)
TSH SERPL DL<=0.05 MIU/L-ACNC: 0.93 UIU/ML (ref 0.34–3.74)
TSH SERPL DL<=0.05 MIU/L-ACNC: 0.94 UIU/ML (ref 0.34–3.74)
WBC # BLD AUTO: 4.4 K/UL (ref 4.8–10.8)

## 2021-10-13 PROCEDURE — G0482 DRUG TEST DEF 15-21 CLASSES: HCPCS

## 2021-10-13 PROCEDURE — G0480 DRUG TEST DEF 1-7 CLASSES: HCPCS

## 2021-10-13 RX ADMIN — BENZTROPINE MESYLATE SCH MG: 1 TABLET ORAL at 21:00

## 2021-10-13 RX ADMIN — DIVALPROEX SODIUM SCH MG: 500 TABLET, DELAYED RELEASE ORAL at 21:00

## 2021-10-13 RX ADMIN — DOCUSATE SODIUM SCH MG: 100 CAPSULE, LIQUID FILLED ORAL at 21:00

## 2021-10-13 RX ADMIN — SIMVASTATIN SCH MG: 20 TABLET, FILM COATED ORAL at 21:00

## 2021-10-13 RX ADMIN — SODIUM CHLORIDE SCH MLS/HR: 9 INJECTION, SOLUTION INTRAVENOUS at 14:10

## 2021-10-13 NOTE — NUR
RECEIVED REPORT FROM ER NURSE. ADMITTED 55 Y/O FEMALE FROM A [facility] FAC[facility] WITH A 
CC OF ALOC. ADMITTING DX OF ALTERED MENTAL STATUS. WITH HX OF DEVELOPMENTAL DISABILITY, 
EPILEPSY, AND IMPULSE CONTROL DISORDER. PT IS AOX2-3 WITH DEVELOPMENTAL DELAY. ABLE TO 
FOLLOW COMMANDS, NO C/O PAIN, NO SOB ON ROOM AIR. AMBULATORY WITH ASSIST, B/B CONTINENT. 
WITH IV ON RAC 20G RUNNING NS AT 100CC/HR. SAFETY AND SEIZURE PRECAUTIONS IN PLACE. STANDARD 
ISOLATION. CALL LIGHT WITHIN REACH. WILL CONTINUE TO MONITOR

## 2021-10-13 NOTE — NUR
56/F BIB CARETAKER STATING PATIENT HAS BEEN INCREASINGLY ALTERED AND CONFUSED 
SINCE LAST NIGHT. STATING SHE NEEDS MORE HELP WITH DAILY ACTIVITIES MORE THAN 
USUAL. DENIES CP, SOB, FEVER, CHILLS. CAREGIVER STATED LAST NIGHT "PATIENT WAS 
UNABLE TO SHOWER ON HER OWN AND EAT ON HER OWN." PER CAREGIVER PT BASELINE IS 
INDEPENDENT AND A&OX3. UPON ASSESSMENT PATIENT IS A&OX3, AMBULATORY, AND ABLE 
TO FOLLOW BASIC COMMANDS.



MEDHX: MILD DEVELOPMENTALLY DISABLED, EPILEPSY, IMPULSE CONTROL DISORDER

ALLERGIES: DENIES

## 2021-10-13 NOTE — NUR
Patient will be admitted to care of DR. PETERSON. Admited to TELE.  Will go to 
room 110B. Belongings list completed.  Report to GENARO VERGARA.

PT TAKEN VIA [place]DEE WITH JIMI WALLACE. PATIENT STABLE UPON TRANSFER

## 2021-10-14 VITALS — DIASTOLIC BLOOD PRESSURE: 60 MMHG | SYSTOLIC BLOOD PRESSURE: 125 MMHG

## 2021-10-14 VITALS — SYSTOLIC BLOOD PRESSURE: 121 MMHG | DIASTOLIC BLOOD PRESSURE: 62 MMHG

## 2021-10-14 VITALS — SYSTOLIC BLOOD PRESSURE: 105 MMHG | DIASTOLIC BLOOD PRESSURE: 69 MMHG

## 2021-10-14 VITALS — SYSTOLIC BLOOD PRESSURE: 108 MMHG | DIASTOLIC BLOOD PRESSURE: 69 MMHG

## 2021-10-14 LAB
ALBUMIN FLD-MCNC: 3.6 G/DL (ref 3.4–5)
ANION GAP SERPL CALCULATED.3IONS-SCNC: 16.3 MMOL/L (ref 8–16)
AST SERPL-CCNC: 25 U/L (ref 15–37)
BASOPHILS # BLD AUTO: 0 K/UL (ref 0–0.22)
BASOPHILS NFR BLD AUTO: 0.9 % (ref 0–2)
BILIRUB SERPL-MCNC: 0.4 MG/DL (ref 0–1)
BUN SERPL-MCNC: 9 MG/DL (ref 7–18)
CHLORIDE SERPL-SCNC: 94 MMOL/L (ref 98–107)
CO2 SERPL-SCNC: 19.7 MMOL/L (ref 21–32)
CREAT SERPL-MCNC: 0.5 MG/DL (ref 0.6–1.3)
EOSINOPHIL # BLD AUTO: 0.1 K/UL (ref 0–0.4)
EOSINOPHIL NFR BLD AUTO: 2.7 % (ref 0–4)
ERYTHROCYTE [DISTWIDTH] IN BLOOD BY AUTOMATED COUNT: 13.7 % (ref 11.6–13.7)
GFR SERPL CREATININE-BSD FRML MDRD: 163 ML/MIN (ref 90–?)
GLUCOSE SERPL-MCNC: 92 MG/DL (ref 74–106)
HCT VFR BLD AUTO: 28.3 % (ref 36–48)
HGB BLD-MCNC: 10 G/DL (ref 12–16)
LYMPHOCYTES # BLD AUTO: 2.6 K/UL (ref 2.5–16.5)
LYMPHOCYTES NFR BLD AUTO: 51.3 % (ref 20.5–51.1)
MAGNESIUM SERPL-MCNC: 2 MG/DL (ref 1.8–2.4)
MCH RBC QN AUTO: 35 PG (ref 27–31)
MCHC RBC AUTO-ENTMCNC: 35 G/DL (ref 33–37)
MCV RBC AUTO: 97.9 FL (ref 80–94)
MONOCYTES # BLD AUTO: 0.4 K/UL (ref 0.8–1)
MONOCYTES NFR BLD AUTO: 8.3 % (ref 1.7–9.3)
NEUTROPHILS # BLD AUTO: 1.9 K/UL (ref 1.8–7.7)
NEUTROPHILS NFR BLD AUTO: 36.8 % (ref 42.2–75.2)
PLATELET # BLD AUTO: 249 K/UL (ref 140–450)
POTASSIUM SERPL-SCNC: 4 MMOL/L (ref 3.5–5.1)
RBC # BLD AUTO: 2.89 MIL/UL (ref 4.2–5.4)
SODIUM SERPL-SCNC: 126 MMOL/L (ref 136–145)
WBC # BLD AUTO: 5.1 K/UL (ref 4.8–10.8)

## 2021-10-14 RX ADMIN — DIVALPROEX SODIUM SCH MG: 500 TABLET, DELAYED RELEASE ORAL at 20:43

## 2021-10-14 RX ADMIN — SIMVASTATIN SCH MG: 20 TABLET, FILM COATED ORAL at 20:43

## 2021-10-14 RX ADMIN — BENZTROPINE MESYLATE SCH MG: 1 TABLET ORAL at 08:49

## 2021-10-14 RX ADMIN — FENOFIBRATE SCH MG: 48 TABLET ORAL at 08:51

## 2021-10-14 RX ADMIN — LEVOTHYROXINE SODIUM SCH MG: 100 TABLET ORAL at 06:56

## 2021-10-14 RX ADMIN — DOCUSATE SODIUM SCH MG: 100 CAPSULE, LIQUID FILLED ORAL at 08:53

## 2021-10-14 RX ADMIN — LACTULOSE SCH GM: 10 SOLUTION ORAL at 20:43

## 2021-10-14 RX ADMIN — Medication SCH TAB: at 08:49

## 2021-10-14 RX ADMIN — BENZTROPINE MESYLATE SCH MG: 1 TABLET ORAL at 20:43

## 2021-10-14 RX ADMIN — SODIUM CHLORIDE SCH MLS/HR: 9 INJECTION, SOLUTION INTRAVENOUS at 00:52

## 2021-10-14 RX ADMIN — LACTULOSE SCH GM: 10 SOLUTION ORAL at 08:51

## 2021-10-14 RX ADMIN — DIVALPROEX SODIUM SCH MG: 500 TABLET, DELAYED RELEASE ORAL at 08:48

## 2021-10-14 NOTE — NUR
RECEIVED REPORT FROM NIGHT SHIFT NURSE FOR CONTINUITY OF CARE. PT IS AOX2, ABLE TO MAKE 
NEEDS KNOWN. WITH DEVELOPMENTAL DELAY. ABLE TO FOLLOW COMMANDS, NO C/O PAIN, NO SOB ON ROOM 
AIR. AMBULATORY WITH ASSIST, B/B CONTINENT. WITH IV ON RAC 20G RUNNING NS AT 100CC/HR. PLAN 
OF CARE DISCUSSED. SAFETY AND SEIZURE PRECAUTIONS IN PLACE. STANDARD ISOLATION. CALL LIGHT 
WITHIN REACH. WILL CONTINUE TO MONITOR

## 2021-10-14 NOTE — NUR
DC PLANNIN YRS OLD FEMALE PATIENT WAS ADMITTED FROM [facility] WITH A DX OF ALOC. 
PATIENT HAS A HX OF DEVELOPMENTAL DELAY AND SEIZURE DISORDER.  CXR SHOWED NO ACUTE 
CARDIOPULMONARY DISEASE. CT HEAD AND CT ABD NEGATIVE. AMMONIA LEVEL 146, ADMINISTERED 
LACTULOSE AND CONTINUED HOME MEDS. CONSULTED WITH GI AND NEPHROLOGIST DUE TO HYPONATREMIA. 
DC PLAN TO GO BACK TO [facility] WHEN STABLE CM TO FOLLOW.  

-------------------------------------------------------------------------------

Addendum: 10/15/21 at 1508 by Fransisca Haley RN

-------------------------------------------------------------------------------

DC PLANNING:

RECEIVED A CALL FROM [facility] DISCUSSED THE CONCERN AND ISSUES  AND REQUESTING SINCE THE 
PLACE HAS NON MEDICAL STAFF AND FREQUENT ADMISSION, REQUESTING THE PATIENT TO GO TO SNF. 
STATED ANY SNF THAT MD CAN FOLLOW UP WILL BE OK. FAXED TO [facility], [facility], AND [facility]. 
CM TO FOLLOW 

-------------------------------------------------------------------------------

Addendum: 10/15/21 at 1654 by Fransisca Haley RN

-------------------------------------------------------------------------------

DC PLANNING:

 [facility] ACCEPTED PATIENT CAN GO TO ROOM 103A # TO GIVE REPORT . TRANSPORT 
ARRANGED BY [facility]  [facility],  TIME 5:45 PM PHONE NUMBER [phone] NOTIFIED 
HANS CHARGE NURSE. CM TO FOLLOW

## 2021-10-14 NOTE — NUR
PATIENT HAS BEEN SCREENED AND CATEGORIZED AS MODERATE NUTRITION RISK. PATIENT WILL BE SEEN 
WITHIN 3-5 DAYS OF ADMISSION.



10/16/21 10/18/21



MELISSA ALVARADO RD

## 2021-10-14 NOTE — NUR
RECEIVED BEDSIDE REPORT FROM DAY SHIFT NURSE. PATIENT IS AWAKE RESPIRATION EVEN UNLABORED ON 
ROOM AIR. NO DISTRESS NOTED. SKIN IS WARM AND DRY. IV PATENT AND INTACT. PLAN OF CARE WAS 
DISCUSSED. ALL SAFETY MEASURES IN PLACE. BED IS AT LOW POSITION. CALL LIGHT WITHIN REACH. 
WILL CONTINUE TO MONITOR.

## 2021-10-15 VITALS — DIASTOLIC BLOOD PRESSURE: 45 MMHG | SYSTOLIC BLOOD PRESSURE: 102 MMHG

## 2021-10-15 VITALS — DIASTOLIC BLOOD PRESSURE: 62 MMHG | SYSTOLIC BLOOD PRESSURE: 102 MMHG

## 2021-10-15 VITALS — DIASTOLIC BLOOD PRESSURE: 69 MMHG | SYSTOLIC BLOOD PRESSURE: 132 MMHG

## 2021-10-15 VITALS — SYSTOLIC BLOOD PRESSURE: 102 MMHG | DIASTOLIC BLOOD PRESSURE: 62 MMHG

## 2021-10-15 VITALS — DIASTOLIC BLOOD PRESSURE: 64 MMHG | SYSTOLIC BLOOD PRESSURE: 117 MMHG

## 2021-10-15 LAB
ALBUMIN FLD-MCNC: 3.3 G/DL (ref 3.4–5)
ANION GAP SERPL CALCULATED.3IONS-SCNC: 9.7 MMOL/L (ref 8–16)
AST SERPL-CCNC: 15 U/L (ref 15–37)
BILIRUB SERPL-MCNC: 0.8 MG/DL (ref 0–1)
BUN SERPL-MCNC: 13 MG/DL (ref 7–18)
CHLORIDE SERPL-SCNC: 105 MMOL/L (ref 98–107)
CO2 SERPL-SCNC: 30.9 MMOL/L (ref 21–32)
CREAT SERPL-MCNC: 0.8 MG/DL (ref 0.6–1.3)
GFR SERPL CREATININE-BSD FRML MDRD: 95 ML/MIN (ref 90–?)
GLUCOSE SERPL-MCNC: 123 MG/DL (ref 74–106)
MAGNESIUM SERPL-MCNC: 2.3 MG/DL (ref 1.8–2.4)
POTASSIUM SERPL-SCNC: 3.6 MMOL/L (ref 3.5–5.1)
SODIUM SERPL-SCNC: 142 MMOL/L (ref 136–145)

## 2021-10-15 RX ADMIN — FENOFIBRATE SCH MG: 48 TABLET ORAL at 08:50

## 2021-10-15 RX ADMIN — BENZTROPINE MESYLATE SCH MG: 1 TABLET ORAL at 08:53

## 2021-10-15 RX ADMIN — Medication SCH TAB: at 08:52

## 2021-10-15 RX ADMIN — LACTULOSE SCH GM: 10 SOLUTION ORAL at 08:50

## 2021-10-15 RX ADMIN — DIVALPROEX SODIUM SCH MG: 500 TABLET, DELAYED RELEASE ORAL at 08:52

## 2021-10-15 RX ADMIN — LEVOTHYROXINE SODIUM SCH MG: 100 TABLET ORAL at 05:50

## 2021-10-15 NOTE — NUR
CHECKED ON PATIENT, PATIENT SLEEPING RESPIRATION EVEN UNLABORED ON ROOM AIR. NO DISTRESS 
NOTED. WILL CONTINUE TO MONITOR

## 2021-10-15 NOTE — NUR
RECEIVED BEDSIDE REPORT FROM NIGHT SHIFT NURSE. PATIENT IS AWAKE RESPIRATION EVEN UNLABORED 
ON ROOM AIR. NO DISTRESS NOTED. SKIN IS WARM AND DRY. IV PATENT AND INTACT. PLAN OF CARE WAS 
DISCUSSED. ALL SAFETY MEASURES IN PLACE. BED IS AT LOW POSITION. CALL LIGHT WITHIN REACH. 
WILL CONTINUE TO MONITOR.

## 2021-10-15 NOTE — NUR
ENDORSED PT TO CARIN NURSE AT [facility] FACILITY PATIENT IS BEING TRANSFERRED TO.  
DR LAUREL SWAIN IS THE MD ON SITE. PT WILL BE IN ROOM 103A. [facility] PICKED UP PT AT 
THE SAME TIME TO TRANSPORT HER TO [facility]. PT WAS AOX2 AND IN STABLE 
CONDITION. RIGHT AC 20G WAS DC AND WAS INTACT. SKIN INTACT. REPORT OF THE RECENT LABS, NEW 
MEDICATIONS, AND FOLLOW UP LABS AND MD ORDERS WERE ENDORSED TO CARIN. DC TO TRANSFER TO 
SNF IS COMPLETE.

## 2021-12-27 ENCOUNTER — HOSPITAL ENCOUNTER (INPATIENT)
Dept: HOSPITAL 26 - MED | Age: 56
LOS: 3 days | Discharge: NURSING, REHAB OR LONG-TERM CARE | DRG: 536 | End: 2021-12-30
Attending: FAMILY MEDICINE | Admitting: FAMILY MEDICINE
Payer: COMMERCIAL

## 2021-12-27 VITALS — BODY MASS INDEX: 27.31 KG/M2 | HEIGHT: 64 IN | WEIGHT: 160 LBS

## 2021-12-27 VITALS — DIASTOLIC BLOOD PRESSURE: 70 MMHG | SYSTOLIC BLOOD PRESSURE: 106 MMHG

## 2021-12-27 DIAGNOSIS — F03.90: ICD-10-CM

## 2021-12-27 DIAGNOSIS — S32.592A: Primary | ICD-10-CM
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
## 2021-12-27 DIAGNOSIS — K21.9: ICD-10-CM
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
## 2021-12-27 DIAGNOSIS — F79: ICD-10-CM

## 2021-12-27 DIAGNOSIS — E86.0: ICD-10-CM
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
## 2021-12-27 DIAGNOSIS — G40.909: ICD-10-CM

## 2021-12-27 DIAGNOSIS — D63.8: ICD-10-CM

## 2021-12-27 DIAGNOSIS — E78.5: ICD-10-CM

## 2021-12-27 DIAGNOSIS — Z20.822: ICD-10-CM

## 2021-12-27 DIAGNOSIS — E03.9: ICD-10-CM

## 2021-12-27 LAB
AMYLASE SERPL-CCNC: 68 U/L (ref 25–115)
ANION GAP SERPL CALCULATED.3IONS-SCNC: 13 MMOL/L (ref 8–16)
APPEARANCE UR: CLEAR
BARBITURATES UR QL SCN: NEGATIVE NG/ML
BASOPHILS # BLD AUTO: 0 K/UL (ref 0–0.22)
BASOPHILS NFR BLD AUTO: 0.2 % (ref 0–2)
BENZODIAZ UR QL SCN: NEGATIVE NG/ML
BILIRUB UR QL STRIP: NEGATIVE
BUN SERPL-MCNC: 23 MG/DL (ref 7–18)
BZE UR QL SCN: NEGATIVE NG/ML
CANNABINOIDS UR QL SCN: NEGATIVE NG/ML
CHLORIDE SERPL-SCNC: 108 MMOL/L (ref 98–107)
CHOLEST/HDLC SERPL: 1.9 {RATIO} (ref 1–4.5)
CO2 SERPL-SCNC: 25.7 MMOL/L (ref 21–32)
COLOR UR: YELLOW
CREAT SERPL-MCNC: 0.6 MG/DL (ref 0.6–1.3)
EOSINOPHIL # BLD AUTO: 0.1 K/UL (ref 0–0.4)
EOSINOPHIL NFR BLD AUTO: 0.6 % (ref 0–4)
ERYTHROCYTE [DISTWIDTH] IN BLOOD BY AUTOMATED COUNT: 14.4 % (ref 11.6–13.7)
GFR SERPL CREATININE-BSD FRML MDRD: 132 ML/MIN (ref 90–?)
GLUCOSE SERPL-MCNC: 119 MG/DL (ref 74–106)
GLUCOSE UR STRIP-MCNC: NEGATIVE MG/DL
HCT VFR BLD AUTO: 29 % (ref 36–48)
HDLC SERPL-MCNC: 67 MG/DL (ref 40–60)
HGB BLD-MCNC: 10 G/DL (ref 12–16)
HGB UR QL STRIP: NEGATIVE
LDLC SERPL CALC-MCNC: 45 MG/DL (ref 60–100)
LEUKOCYTE ESTERASE UR QL STRIP: NEGATIVE
LIPASE SERPL-CCNC: 260 U/L (ref 73–393)
LYMPHOCYTES # BLD AUTO: 1.5 K/UL (ref 2.5–16.5)
LYMPHOCYTES NFR BLD AUTO: 17.5 % (ref 20.5–51.1)
MAGNESIUM SERPL-MCNC: 1.9 MG/DL (ref 1.8–2.4)
MCH RBC QN AUTO: 33 PG (ref 27–31)
MCHC RBC AUTO-ENTMCNC: 34 G/DL (ref 33–37)
MCV RBC AUTO: 96.1 FL (ref 80–94)
MONOCYTES # BLD AUTO: 0.4 K/UL (ref 0.8–1)
MONOCYTES NFR BLD AUTO: 4.8 % (ref 1.7–9.3)
NEUTROPHILS # BLD AUTO: 6.5 K/UL (ref 1.8–7.7)
NEUTROPHILS NFR BLD AUTO: 76.9 % (ref 42.2–75.2)
NITRITE UR QL STRIP: NEGATIVE
OPIATES UR QL SCN: POSITIVE NG/ML
PCP UR QL SCN: NEGATIVE NG/ML
PH UR STRIP: 6.5 [PH] (ref 5–9)
PHOSPHATE SERPL-MCNC: 3.2 MG/DL (ref 2.5–4.9)
PLATELET # BLD AUTO: 281 K/UL (ref 140–450)
POTASSIUM SERPL-SCNC: 3.7 MMOL/L (ref 3.5–5.1)
PROTHROMBIN TIME: 10.4 SECS (ref 10.8–13.4)
RBC # BLD AUTO: 3.02 MIL/UL (ref 4.2–5.4)
SODIUM SERPL-SCNC: 143 MMOL/L (ref 136–145)
T4 FREE SERPL-MCNC: 0.57 NG/DL (ref 0.76–1.46)
TRIGL SERPL-MCNC: 82 MG/DL (ref 30–150)
TSH SERPL DL<=0.05 MIU/L-ACNC: 2.56 UIU/ML (ref 0.34–3.74)
WBC # BLD AUTO: 8.5 K/UL (ref 4.8–10.8)

## 2021-12-27 RX ADMIN — HYDROCODONE BITARTRATE AND ACETAMINOPHEN PRN TAB: 7.5; 325 TABLET ORAL at 15:48

## 2021-12-27 NOTE — NUR
PT NOW REPORTING SHE FELL ONTO HER L LEG YESTERDAY AND HAD BEEN LAYING ON HER L 
SIDE X1 DAY. PT C/O OF ONLY PAIN TO L LEG/HIP AREA UPON MOVEMENT.
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
## 2021-12-27 NOTE — NUR
RECEIVED PT IN [facility] ALERT ORIENTED, HX OF DEVELOPMENTAL DELAY PT IS AT 
BASELINE. HERE FOR LEFT SIDED PELVIC FX. DENIES ANY PAIN AT THIS TIME. SAFETY 
MAINTAINED. NAD.

## 2021-12-27 NOTE — NUR
SPOKE TO PT CAREGIVER LUPILLO ARIAS WHO CALLED [facility]. PER LUPILLO UNKNOWN 
TIMEPT FELL, LUPILLO ARRIVED AT WORK AND PT WAS ALREADY ON FLOOR.

## 2021-12-27 NOTE — NUR
PROVIDED PT W PERNIEAL CARE, PT BM LOOSE STOOLS, JELLY LIKE, STRONG SMELLING. 
SMAPLE COLLECTED AND SENT TO LAB.

## 2021-12-27 NOTE — NUR
PT APPEARS TO BE RESTING IN SUPINE POSITION, HOB ELEVATED W X2 SIDERAILS UP FOR 
PT SAFETY. BED LOCKED INLOWEST POSITION. VSS. BREATHING EVEN AND UNLABORED. NAD 
NOTED, WILL CONTINUE TO MONITOR.

## 2021-12-27 NOTE — NUR
PT APPEARS TO BE RESTING W EYES CLOSED IN SUPINE POSITION. BED LOCKED IN LOWEST 
POSITION W X2 SIDERAILS UP FOR PT SAFETY, HOB SLIGHTLY ELEVATED. CONNECTED TO 
MONITOR W VSS. BREATHING EVEN AND UNLABORED. NAD NOTED, WILL CONTINUE TO 
MONITOR.

## 2021-12-28 VITALS — SYSTOLIC BLOOD PRESSURE: 113 MMHG | DIASTOLIC BLOOD PRESSURE: 71 MMHG

## 2021-12-28 LAB
T3RU NFR SERPL: 24 % (ref 24–39)
T4 SERPL-MCNC: 3.9 UG/DL (ref 4.5–12)

## 2021-12-28 RX ADMIN — HYDROCODONE BITARTRATE AND ACETAMINOPHEN PRN TAB: 7.5; 325 TABLET ORAL at 13:02

## 2021-12-28 RX ADMIN — HYDROCODONE BITARTRATE AND ACETAMINOPHEN PRN TAB: 7.5; 325 TABLET ORAL at 03:54

## 2021-12-28 RX ADMIN — HYDROCODONE BITARTRATE AND ACETAMINOPHEN PRN TAB: 7.5; 325 TABLET ORAL at 18:45

## 2021-12-28 RX ADMIN — PANTOPRAZOLE SODIUM SCH MG: 40 TABLET, DELAYED RELEASE ORAL at 10:48

## 2021-12-28 NOTE — NUR
Patient appears to be resting comfortably in bed- awake and finished food. 
Vital Signs within normal limits. No signs of distress noted. patient does c/o 
pain in the pelvic region but received norco less than 30 minutes ago. no 
fluids running in IV but 10cc is flushable through. Safety measures are in 
place, attached to the monitor and will continue to monitor patient.

## 2021-12-28 NOTE — NUR
PATIENT PROVIDED WITH BREAKFAST TRAY, ASSISTED UP IN BED. PATIENT SITTING UP IN 
BED EATING BREAKFAST. ALL NEEDS MET AT THIS TIME.

## 2021-12-28 NOTE — NUR
Patient will be admitted to care of Angela ROMERO. Admited to Telemetry.  Will go to 
room 111B. Belongings list completed.  Report to Linda LAM.

## 2021-12-28 NOTE — NUR
PT APPEARS TO BE RESTING IN SUPINE POSITION W EYES CLOSED. HOB ELEVATED, BD 
LOCKD IN LOWEST POSITION W X2 SIDERAILS UP FOR PT SAFETY. VSS. BREATHING EVEN 
AND UNLABORED. NAD NOTED, WILL CONTINUE TO MONITOR.

## 2021-12-28 NOTE — NUR
PATIENT SITTING UP IN BED AWAKE PLAYING WITH HER STUFFED ANIMAL. PATIENT ON 
BEDSIDE CARDIAC MONITOR, WILL CONTINUE TO MONITOR.
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
## 2021-12-29 VITALS — SYSTOLIC BLOOD PRESSURE: 118 MMHG | DIASTOLIC BLOOD PRESSURE: 69 MMHG

## 2021-12-29 VITALS — SYSTOLIC BLOOD PRESSURE: 103 MMHG | DIASTOLIC BLOOD PRESSURE: 62 MMHG

## 2021-12-29 VITALS — DIASTOLIC BLOOD PRESSURE: 68 MMHG | SYSTOLIC BLOOD PRESSURE: 110 MMHG

## 2021-12-29 LAB
ANION GAP SERPL CALCULATED.3IONS-SCNC: 11.5 MMOL/L (ref 8–16)
BASOPHILS # BLD AUTO: 0 K/UL (ref 0–0.22)
BASOPHILS NFR BLD AUTO: 0.1 % (ref 0–2)
BUN SERPL-MCNC: 15 MG/DL (ref 7–18)
CHLORIDE SERPL-SCNC: 103 MMOL/L (ref 98–107)
CO2 SERPL-SCNC: 28.8 MMOL/L (ref 21–32)
CREAT SERPL-MCNC: 0.6 MG/DL (ref 0.6–1.3)
EOSINOPHIL # BLD AUTO: 0.1 K/UL (ref 0–0.4)
EOSINOPHIL NFR BLD AUTO: 0.8 % (ref 0–4)
ERYTHROCYTE [DISTWIDTH] IN BLOOD BY AUTOMATED COUNT: 14.4 % (ref 11.6–13.7)
GFR SERPL CREATININE-BSD FRML MDRD: 132 ML/MIN (ref 90–?)
GLUCOSE SERPL-MCNC: 126 MG/DL (ref 74–106)
HCT VFR BLD AUTO: 30.3 % (ref 36–48)
HGB BLD-MCNC: 10.5 G/DL (ref 12–16)
LYMPHOCYTES # BLD AUTO: 1 K/UL (ref 2.5–16.5)
LYMPHOCYTES NFR BLD AUTO: 14.3 % (ref 20.5–51.1)
MCH RBC QN AUTO: 33 PG (ref 27–31)
MCHC RBC AUTO-ENTMCNC: 35 G/DL (ref 33–37)
MCV RBC AUTO: 95.9 FL (ref 80–94)
MONOCYTES # BLD AUTO: 0.4 K/UL (ref 0.8–1)
MONOCYTES NFR BLD AUTO: 4.9 % (ref 1.7–9.3)
NEUTROPHILS # BLD AUTO: 5.7 K/UL (ref 1.8–7.7)
NEUTROPHILS NFR BLD AUTO: 79.9 % (ref 42.2–75.2)
PLATELET # BLD AUTO: 308 K/UL (ref 140–450)
POTASSIUM SERPL-SCNC: 4.3 MMOL/L (ref 3.5–5.1)
RBC # BLD AUTO: 3.17 MIL/UL (ref 4.2–5.4)
SODIUM SERPL-SCNC: 139 MMOL/L (ref 136–145)
WBC # BLD AUTO: 7.2 K/UL (ref 4.8–10.8)

## 2021-12-29 RX ADMIN — BENZTROPINE MESYLATE SCH MG: 1 TABLET ORAL at 13:34

## 2021-12-29 RX ADMIN — HYDROCODONE BITARTRATE AND ACETAMINOPHEN PRN TAB: 7.5; 325 TABLET ORAL at 17:39

## 2021-12-29 RX ADMIN — HYDROCODONE BITARTRATE AND ACETAMINOPHEN PRN TAB: 7.5; 325 TABLET ORAL at 05:18

## 2021-12-29 RX ADMIN — BENZTROPINE MESYLATE SCH MG: 1 TABLET ORAL at 21:33

## 2021-12-29 RX ADMIN — PANTOPRAZOLE SODIUM SCH MG: 40 TABLET, DELAYED RELEASE ORAL at 08:33

## 2021-12-29 RX ADMIN — DIVALPROEX SODIUM SCH MG: 500 TABLET, DELAYED RELEASE ORAL at 13:34

## 2021-12-29 RX ADMIN — DIVALPROEX SODIUM SCH MG: 500 TABLET, DELAYED RELEASE ORAL at 21:32

## 2021-12-29 NOTE — NUR
CONDITION REMAIN STABLE. ABLE TO SLEEP WELL. WILL ENDORSED TO AM SHIFT NURSE FOR CONTINUITY 
OF CARE.

## 2021-12-29 NOTE — NUR
NOTIFIED DR KUHN OF [facility] CONSULT ORDER AND SAID HE'S NOT GOING TO DO SURGERY. DIET CHANGED 
TO REGULAR AS PER MD ORDER. PT IN BED, BREATHING EVEN AND UNLABORED. DENIES PAIN AT THIS 
TIME. ALL SAFETY MEASURES IN PLACE. CALL LIGHT WITHIN REACH

## 2021-12-29 NOTE — NUR
PATIENT HAS BEEN SCREENED AND CATEGORIZED AS LOW NUTRITION RISK. PATIENT WILL BE SEEN WITHIN 
7 DAYS OF ADMISSION.



1/2/22



ADRIANNE YUSUF RD

## 2021-12-29 NOTE — NUR
PT IN BED, AWAKE. BREATHING EVEN AND UNLABORED. NO DISTRESS NOTED AT THIS TIME. ALL SAFETY 
MEASURES IN PLACE. WILL CONTINUE TO MONITOR.

## 2021-12-30 VITALS — DIASTOLIC BLOOD PRESSURE: 69 MMHG | SYSTOLIC BLOOD PRESSURE: 115 MMHG

## 2021-12-30 VITALS — DIASTOLIC BLOOD PRESSURE: 70 MMHG | SYSTOLIC BLOOD PRESSURE: 105 MMHG

## 2021-12-30 LAB
ANION GAP SERPL CALCULATED.3IONS-SCNC: 13.6 MMOL/L (ref 8–16)
BASOPHILS # BLD AUTO: 0 K/UL (ref 0–0.22)
BASOPHILS NFR BLD AUTO: 0.2 % (ref 0–2)
BUN SERPL-MCNC: 12 MG/DL (ref 7–18)
CHLORIDE SERPL-SCNC: 101 MMOL/L (ref 98–107)
CO2 SERPL-SCNC: 26.2 MMOL/L (ref 21–32)
CREAT SERPL-MCNC: 0.6 MG/DL (ref 0.6–1.3)
EOSINOPHIL # BLD AUTO: 0 K/UL (ref 0–0.4)
EOSINOPHIL NFR BLD AUTO: 0.9 % (ref 0–4)
ERYTHROCYTE [DISTWIDTH] IN BLOOD BY AUTOMATED COUNT: 14.3 % (ref 11.6–13.7)
GFR SERPL CREATININE-BSD FRML MDRD: 132 ML/MIN (ref 90–?)
GLUCOSE SERPL-MCNC: 121 MG/DL (ref 74–106)
HCT VFR BLD AUTO: 31.9 % (ref 36–48)
HGB BLD-MCNC: 10.8 G/DL (ref 12–16)
LYMPHOCYTES # BLD AUTO: 1.5 K/UL (ref 2.5–16.5)
LYMPHOCYTES NFR BLD AUTO: 29.6 % (ref 20.5–51.1)
MCH RBC QN AUTO: 32 PG (ref 27–31)
MCHC RBC AUTO-ENTMCNC: 34 G/DL (ref 33–37)
MCV RBC AUTO: 95.7 FL (ref 80–94)
MONOCYTES # BLD AUTO: 0.3 K/UL (ref 0.8–1)
MONOCYTES NFR BLD AUTO: 5.3 % (ref 1.7–9.3)
NEUTROPHILS # BLD AUTO: 3.3 K/UL (ref 1.8–7.7)
NEUTROPHILS NFR BLD AUTO: 64 % (ref 42.2–75.2)
PLATELET # BLD AUTO: 300 K/UL (ref 140–450)
POTASSIUM SERPL-SCNC: 3.8 MMOL/L (ref 3.5–5.1)
RBC # BLD AUTO: 3.33 MIL/UL (ref 4.2–5.4)
SODIUM SERPL-SCNC: 137 MMOL/L (ref 136–145)
WBC # BLD AUTO: 5.2 K/UL (ref 4.8–10.8)

## 2021-12-30 RX ADMIN — DIVALPROEX SODIUM SCH MG: 500 TABLET, DELAYED RELEASE ORAL at 09:18

## 2021-12-30 RX ADMIN — BENZTROPINE MESYLATE SCH MG: 1 TABLET ORAL at 09:15

## 2021-12-30 RX ADMIN — PANTOPRAZOLE SODIUM SCH MG: 40 TABLET, DELAYED RELEASE ORAL at 09:18

## 2021-12-30 RX ADMIN — HYDROCODONE BITARTRATE AND ACETAMINOPHEN PRN TAB: 7.5; 325 TABLET ORAL at 13:55

## 2021-12-30 NOTE — NUR
DID ROUNDS ON PT. PT IN BED RESTING AT THIS TIME. RESPIRATIONS ARE EVEN AND UNLABORED. NO 
SIGNS OF DISTRESS NOTED. WILL CONTINUE TO MONITOR.

## 2021-12-30 NOTE — NUR
PER , TRANSPORT IS RUNNING LATE. NEW  TIME IS BETWEEN [id number]. PT MADE 
AWARE. WILL CONTINUE TO MONITOR.

## 2021-12-30 NOTE — NUR
DID ROUNDS ON PT. PT IS IN BED SLEEPING AT THIS TIME. RESPIRATIONS ARE EVEN AND UNLABORED. 
NO SIGNS OF DISTRESS NOTED. STILL AWAITING TRANSPORT TO  PT. CALL LIGHT WITHIN REACH. 
ALL SAFETY MEASURES IN PLACE. WILL CONTINUE TO MONITOR.

## 2021-12-30 NOTE — NUR
[facility] IV CATHETER REMOVED, IV CATHETER IN TACT. WRIST 
BAND REMOVED. ALL BELONGINGS TAKEN UPON DISCHARGE. PT WAS PICKED UP BY [facility] 
[facility], ACCOMPANIED BY 2 TRANSPORT STAFF. PICKED UP VIA [facility].

## 2021-12-30 NOTE — NUR
RECEIVED REPORT FROM NIGHT SHIFT NURSE FOR CONTINUITY OF CARE. PT IS IN BED AT THIS TIME, 
SLEEPING. RESPIRATIONS ARE EVEN AND UNLABORED. NO SIGNS OF DISTRESS NOTED. NO COMPLAINTS OF 
PAIN OR DISCOMFORT NOTED. CALL LIGHT WITHIN REACH. ALL SAFETY MEASURES IN PLACE. WILL 
CONTINUE TO MONITOR.

## 2021-12-30 NOTE — NUR
ADMINISTERED ALL SCHEDULED MEDICATIONS. EDUCATED PT ON MEDS ADMINISTERED. ASSISTED PT TO SIT 
UP AND EAT BREAKFAST. PT TOLERATED WELL. WILL CONTINUE TO MONITOR.

## 2021-12-30 NOTE — NUR
CALLED [facility] TO GIVE REPORT. SPOKE TO ESTEPHANIE. ANSWERED ALL QUESTIONS. 
DISCHARGE PAPERWORK DONE. PT IS SUPPOSED TO BE PICKED UP BETWEEN 1400 AND 1430. WILL 
CONTINUE TO MONITOR.
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
